# Patient Record
Sex: MALE | Race: WHITE | NOT HISPANIC OR LATINO | Employment: OTHER | URBAN - METROPOLITAN AREA
[De-identification: names, ages, dates, MRNs, and addresses within clinical notes are randomized per-mention and may not be internally consistent; named-entity substitution may affect disease eponyms.]

---

## 2017-01-16 ENCOUNTER — COMMUNICATION - HEALTHEAST (OUTPATIENT)
Dept: INTERNAL MEDICINE | Facility: CLINIC | Age: 37
End: 2017-01-16

## 2017-01-16 ENCOUNTER — OFFICE VISIT - HEALTHEAST (OUTPATIENT)
Dept: INTERNAL MEDICINE | Facility: CLINIC | Age: 37
End: 2017-01-16

## 2017-01-16 DIAGNOSIS — N52.9 ERECTILE DYSFUNCTION: ICD-10-CM

## 2017-01-16 DIAGNOSIS — E29.1 HYPOGONADISM IN MALE: ICD-10-CM

## 2017-01-26 ENCOUNTER — COMMUNICATION - HEALTHEAST (OUTPATIENT)
Dept: INTERNAL MEDICINE | Facility: CLINIC | Age: 37
End: 2017-01-26

## 2017-02-09 ENCOUNTER — COMMUNICATION - HEALTHEAST (OUTPATIENT)
Dept: INTERNAL MEDICINE | Facility: CLINIC | Age: 37
End: 2017-02-09

## 2017-03-10 ENCOUNTER — COMMUNICATION - HEALTHEAST (OUTPATIENT)
Dept: INTERNAL MEDICINE | Facility: CLINIC | Age: 37
End: 2017-03-10

## 2017-03-13 ENCOUNTER — COMMUNICATION - HEALTHEAST (OUTPATIENT)
Dept: INTERNAL MEDICINE | Facility: CLINIC | Age: 37
End: 2017-03-13

## 2017-03-20 ENCOUNTER — COMMUNICATION - HEALTHEAST (OUTPATIENT)
Dept: INTERNAL MEDICINE | Facility: CLINIC | Age: 37
End: 2017-03-20

## 2017-03-22 ENCOUNTER — COMMUNICATION - HEALTHEAST (OUTPATIENT)
Dept: INTERNAL MEDICINE | Facility: CLINIC | Age: 37
End: 2017-03-22

## 2017-08-03 ENCOUNTER — COMMUNICATION - HEALTHEAST (OUTPATIENT)
Dept: INTERNAL MEDICINE | Facility: CLINIC | Age: 37
End: 2017-08-03

## 2017-10-25 ENCOUNTER — COMMUNICATION - HEALTHEAST (OUTPATIENT)
Dept: INTERNAL MEDICINE | Facility: CLINIC | Age: 37
End: 2017-10-25

## 2017-10-26 ENCOUNTER — RECORDS - HEALTHEAST (OUTPATIENT)
Dept: ADMINISTRATIVE | Facility: OTHER | Age: 37
End: 2017-10-26

## 2018-05-04 ENCOUNTER — COMMUNICATION - HEALTHEAST (OUTPATIENT)
Dept: INTERNAL MEDICINE | Facility: CLINIC | Age: 38
End: 2018-05-04

## 2018-05-04 DIAGNOSIS — N52.9 ERECTILE DYSFUNCTION: ICD-10-CM

## 2018-06-04 ENCOUNTER — OFFICE VISIT - HEALTHEAST (OUTPATIENT)
Dept: INTERNAL MEDICINE | Facility: CLINIC | Age: 38
End: 2018-06-04

## 2018-06-04 DIAGNOSIS — N52.9 ERECTILE DYSFUNCTION: ICD-10-CM

## 2018-06-04 DIAGNOSIS — E29.1 HYPOGONADISM IN MALE: ICD-10-CM

## 2018-06-04 ASSESSMENT — MIFFLIN-ST. JEOR: SCORE: 1534.95

## 2018-06-11 ENCOUNTER — COMMUNICATION - HEALTHEAST (OUTPATIENT)
Dept: INTERNAL MEDICINE | Facility: CLINIC | Age: 38
End: 2018-06-11

## 2018-06-11 DIAGNOSIS — N52.9 ERECTILE DYSFUNCTION: ICD-10-CM

## 2018-06-12 ENCOUNTER — COMMUNICATION - HEALTHEAST (OUTPATIENT)
Dept: INTERNAL MEDICINE | Facility: CLINIC | Age: 38
End: 2018-06-12

## 2018-06-12 DIAGNOSIS — N52.9 ERECTILE DYSFUNCTION: ICD-10-CM

## 2019-06-20 ENCOUNTER — COMMUNICATION - HEALTHEAST (OUTPATIENT)
Dept: INTERNAL MEDICINE | Facility: CLINIC | Age: 39
End: 2019-06-20

## 2019-06-20 DIAGNOSIS — N52.9 ERECTILE DYSFUNCTION: ICD-10-CM

## 2019-09-05 ENCOUNTER — COMMUNICATION - HEALTHEAST (OUTPATIENT)
Dept: INTERNAL MEDICINE | Facility: CLINIC | Age: 39
End: 2019-09-05

## 2019-09-06 ENCOUNTER — COMMUNICATION - HEALTHEAST (OUTPATIENT)
Dept: INTERNAL MEDICINE | Facility: CLINIC | Age: 39
End: 2019-09-06

## 2019-09-06 DIAGNOSIS — N52.9 ERECTILE DYSFUNCTION: ICD-10-CM

## 2019-11-07 ENCOUNTER — COMMUNICATION - HEALTHEAST (OUTPATIENT)
Dept: INTERNAL MEDICINE | Facility: CLINIC | Age: 39
End: 2019-11-07

## 2019-11-07 DIAGNOSIS — N52.9 ERECTILE DYSFUNCTION: ICD-10-CM

## 2020-02-03 ENCOUNTER — COMMUNICATION - HEALTHEAST (OUTPATIENT)
Dept: INTERNAL MEDICINE | Facility: CLINIC | Age: 40
End: 2020-02-03

## 2020-02-03 DIAGNOSIS — N52.9 ERECTILE DYSFUNCTION: ICD-10-CM

## 2020-03-11 ENCOUNTER — OFFICE VISIT (OUTPATIENT)
Dept: URGENT CARE | Facility: URGENT CARE | Age: 40
End: 2020-03-11
Payer: COMMERCIAL

## 2020-03-11 ENCOUNTER — VIRTUAL VISIT (OUTPATIENT)
Dept: FAMILY MEDICINE | Facility: OTHER | Age: 40
End: 2020-03-11

## 2020-03-11 ENCOUNTER — COMMUNICATION - HEALTHEAST (OUTPATIENT)
Dept: SCHEDULING | Facility: CLINIC | Age: 40
End: 2020-03-11

## 2020-03-11 DIAGNOSIS — R05.9 COUGH: Primary | ICD-10-CM

## 2020-03-11 DIAGNOSIS — R50.9 FEVER, UNSPECIFIED: ICD-10-CM

## 2020-03-11 LAB
FLUAV+FLUBV AG SPEC QL: NEGATIVE
FLUAV+FLUBV AG SPEC QL: NEGATIVE
SPECIMEN SOURCE: NORMAL

## 2020-03-11 PROCEDURE — 99203 OFFICE O/P NEW LOW 30 MIN: CPT | Performed by: INTERNAL MEDICINE

## 2020-03-11 PROCEDURE — 87804 INFLUENZA ASSAY W/OPTIC: CPT | Performed by: INTERNAL MEDICINE

## 2020-03-11 NOTE — PROGRESS NOTES
"Date: 2020 13:26:09  Clinician: Adelina Romero  Clinician NPI: [id number]  Patient: Gerry Blevins  Patient : 1980  Patient Address: [address] [place] [address]  Patient Phone: [phone]  Visit Protocol: URI  Patient Summary:  Gerry is a 40 year old ( : 1980 ) male who initiated a Visit for COVID-19 (Coronavirus) evaluation and screening. When asked the question \"Please sign me up to receive news, health information and promotions from [employer].\", Gerry responded \"Yes\".    Gerry states his symptoms started suddenly 3-6 days ago. After his symptoms started, they improved and then got worse again.   His symptoms consist of rhinitis, malaise, a sore throat, a cough, nasal congestion, a headache, enlarged lymph nodes, chills, facial pain or pressure, and myalgia. He is experiencing difficulty breathing due to nasal congestion but he is not short of breath. Gerry also feels feverish but was unable to measure his temperature.   Symptom details     Nasal secretions: The color of his mucus is yellow.    Cough: Gerry coughs every 5-10 minutes and his cough is not more bothersome at night. Phlegm comes into his throat when he coughs. He does not believe his cough is caused by post-nasal drip. The color of the phlegm is yellow.     Sore throat: Gerry reports having severe throat pain (7-9 on a 10 point pain scale), does not have exudate on his tonsils, and can swallow liquids. The lymph nodes in his neck are enlarged. A rash has not appeared on the skin since the sore throat started.     Facial pain or pressure: The facial pain or pressure does not feel worse when bending or leaning forward.     Headache: He states the headache is moderate (4-6 on a 10 point pain scale).      Gerry denies having wheezing, ear pain, and teeth pain. He also denies having recent facial or sinus surgery in the past 60 days and having a sinus infection within the past year.   Precipitating events  Gerry is not " sure if he has been exposed to someone with strep throat. He has not recently been exposed to someone with influenza. Gerry has been in close contact with the following high risk individuals: people with asthma, heart disease or diabetes and adults 65 or older.   Pertinent COVID-19 (Coronavirus) information  Gerry has traveled internationally in the last 14 days before the start of his symptoms. Countries traveled as reported by the patient (free text): 4 domestic flights including 2 layovers at [place] [employer].  I count that as relevant.   Gerry has not had close contact with a laboratory confirmed positive COVID-19 patient within 14 days of symptom onset.   Pertinent medical history  Gerry has taken an antibiotic medication in the past month. Antibiotic details as reported by the patient (free text): Penicillin [id number] [id number] to rule out strep.  No improvement seen in fever or pharyngitis.  I'm a doctor.   Gerry does not need a return to work/school note.   Weight: 135 lbs   Gerry does not smoke or use smokeless tobacco.   Additional information as reported by the patient (free text): I work in a clinical setting.  Need to know if it's safe to return to work in [place].   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 So for that reason, I am giving you the information to allow you to go get tested.&nbsp;  Here is the most current information/recommendations from the [place] Department of [facility] in case you want to review for  yourself to decide if you want to go in for the testing:&nbsp;  [email or web address]/diseases/coronavirus/hcp/eval.html       Based on the information you have provided, it is recommended that you go to one of our designated Corona Virus 19 testing centers to get a test done from your car. To do this follow these instructions:   You should go to one of our dedicated testing centers as soon as possible during the hours below at one of these locations:   Walk-in Care: [facility] at [address], [place] [address]. Hours: M-F 7am - 6pm, Sat-Sun 8am -- 3pm  [facility] at [address] [place] [id number]. Hours: Every Day 9am -- 8pm  Walk-in Care: [facility] at [address] [place] [address]. Hours: M-F 7am - 6pm, Sat-Sun 8am -- 3pm  [place] [address] [place] [address]. Hours: M-F 11am -- 8pm, Sat-Sun 9am-4pm   What to expect:   When you arrive please come park in the parking lot.  Call [phone] and let them know which of the four clinics you are at, description of your car and where you are parked. Mention you did an [facility] visit and were sent for testing.  They will add you to the queue to get your test (you will stay in your car the entire time).  On that phone call you will give them the information to register you for the visit.  You will then be met by a provider who will perform a brief assessment in your car and collect samples to send for Corona Virus 19, influenza and possibly RSV.  You will be given patient information about respiratory illnesses and instructions about signing up for mychart to get your results as quickly as possible or we will call you with the results if you are not on mychart.   Isolate Yourself:   Isolate yourself while traveling.  Do Not allow any visitors within 6 feet.  Do Not go to work or school.  Do Not go to [facility],  centers, shopping, or other  public places.  Do Not shake hands.  Avoid close contact with others (hugging, kissing).   Protect Others:   Cover Your Mouth and Nose with a mask, disposable tissue or wash cloth to avoid spreading germs to others.  Wash your hands and face frequently with soap and water   Fever Medicines:   For fever relief, take acetaminophen or ibuprofen.  Treat fevers above 101deg F (38.3deg C) to lower fevers and make you more comfortable.  Acetaminophen (e.g., Tylenol): Take 650 mg (two 325 mg pills) by mouth every 4-6 hours as needed of regular strength Tylenol or 1,000 mg (two 500 mg pills) every 8 hours as needed of Extra Strength Tylenol.  Ibuprofen (e.g., Motrin, Advil): Take 400 mg (two 200 mg pills) by mouth every 6 hours as needed.  Acetaminophen is thought to be safer than ibuprofen or naproxen for people over 65 years old. Acetaminophen is in many OTC and prescription medicines. It might be in more than one medicine that you are taking. You need to be careful and not take an overdose. Before taking any medicine, read all the instructions on the package.  Caution -NSAIDs (e.g., ibuprofen, naproxen): Do not take nonsteroidal anti-inflammatory drugs (NSAIDs) if you have stomach problems, kidney disease, heart failure, or other contraindications to using this type of medicine. Do not take NSAID medicines for over 7 days without consulting your PCP. Do not take NSAID medicines if you are pregnant. Do not take NSAID medicines if you are also taking blood thinners.   Call Back If: Breathing difficulty develops or you become worse.  Thank you for limiting contact with others, wearing a simple mask to cover your cough, practice good hand hygiene habits and accessing our virtual services where possible to limit the spread of this virus.  For more information about COVID19 and options for caring for yourself at home, please visit the CDC website at https://www.cdc.gov/coronavirus/2019-ncov/about/steps-when-sick.html  For more  options for care at [facility], please visit our website at [email or web address]    Diagnosis: Cough  Diagnosis ICD: R05

## 2020-03-11 NOTE — PATIENT INSTRUCTIONS
You are being tested for Corona Virus 19, Influenza and possibly RSV.    Please use the information at the end of this document to sign up for [facility] [email or web address] where you can get your results and a message about those results sent to you through the [employer]TV application. If you do not have mychart we will call you with your results but it may take longer.    Isolate Yourself:    Isolate yourself while traveling.    Do Not allow any visitors within 6 feet.    Do Not go to work or school.    Do Not go to [employer],  centers, shopping, or other public places.    Do Not shake hands.    Avoid close contact with others (hugging, kissing).    Protect Others:    Cover Your Mouth and Nose with a mask, disposable tissue or wash cloth to avoid spreading germs to others.    Wash your hands and face frequently with soap and water    Call Back If: Breathing difficulty develops or you become worse.    For more information about COVID19 and options for caring for yourself at home, please visit the CDC website at https://www.cdc.gov/coronavirus/2019-ncov/about/steps-when-sick.html  For more options for care at [facility], please visit our website at [email or web address]

## 2020-03-11 NOTE — PROGRESS NOTES
SUBJECTIVE:  This  40 year old male presents for COVID-19 testing.  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  Not exposed to a known COVID-19 case but was in a region with a higher number of cases.  He notes onset of high fever, sore throat, cough and nasal congestion on 3/8.    OBJECTIVE:  Visual assessment shows an overtly healthy adult male, non-toxic. No apparent respiratory distress; speaking comfortably.    ASSESSMENT/PLAN:    ICD-10-CM    1. Cough  R05 Influenza A & B Antigen     COVID-19 Virus (Coronavirus), PCR - NP & OP Swabs Nasopharyngeal (NP) Swab in UTM AND Oropharyngeal (OP) Swab in UTM   2. Fever, unspecified  R50.9 Influenza A & B Antigen     COVID-19 Virus (Coronavirus), PCR - NP & OP Swabs Nasopharyngeal (NP) Swab in UTM AND Oropharyngeal (OP) Swab in UTM   Total time spent face-to-face with the patient/family was 10 minutes of which 5 minutes were spent in counseling and care coordination.  Discussions focussed on the above documented medical issues.     Doe Cole MD

## 2020-03-13 ENCOUNTER — TELEPHONE (OUTPATIENT)
Dept: URGENT CARE | Facility: URGENT CARE | Age: 40
End: 2020-03-13

## 2020-03-13 NOTE — RESULT ENCOUNTER NOTE
Lab test 2019-nCoV rRt-PCR  Oropharyngeal AND/OR Nasopharyngeal swabs are NEGATIVE for 2019-nCoV RNA

## 2020-03-21 LAB
COVID-19 VIRUS PCR RESULT FROM MDH: NEGATIVE
LAB SCANNED RESULT: NORMAL

## 2020-03-22 ENCOUNTER — HEALTH MAINTENANCE LETTER (OUTPATIENT)
Age: 40
End: 2020-03-22

## 2020-04-22 ENCOUNTER — COMMUNICATION - HEALTHEAST (OUTPATIENT)
Dept: INTERNAL MEDICINE | Facility: CLINIC | Age: 40
End: 2020-04-22

## 2020-04-22 DIAGNOSIS — N52.9 ERECTILE DYSFUNCTION: ICD-10-CM

## 2020-07-22 ENCOUNTER — COMMUNICATION - HEALTHEAST (OUTPATIENT)
Dept: INTERNAL MEDICINE | Facility: CLINIC | Age: 40
End: 2020-07-22

## 2020-07-22 DIAGNOSIS — N52.9 ERECTILE DYSFUNCTION: ICD-10-CM

## 2021-01-15 ENCOUNTER — HEALTH MAINTENANCE LETTER (OUTPATIENT)
Age: 41
End: 2021-01-15

## 2021-05-15 ENCOUNTER — HEALTH MAINTENANCE LETTER (OUTPATIENT)
Age: 41
End: 2021-05-15

## 2021-05-29 NOTE — TELEPHONE ENCOUNTER
Medication Request  Medication name:   sildenafil, antihypertensive, (REVATIO) 20 mg tablet 90 tablet 12 2018     Sig: TAKE 1 TABLET BY MOUTH AS NEEDED FOR ERECTILE DYSFUNCTION. MAX 5 TABLETS IN 24 HOURS      Pharmacy Name and Location: [facility]  Reason for request: Original Rx is   When did you use medication last?:  Unknown  Patient offered appointment:  Request was made by patient's pharmacy  Okay to leave a detailed message: no

## 2021-05-30 VITALS — BODY MASS INDEX: 22.55 KG/M2 | WEIGHT: 144 LBS

## 2021-06-01 VITALS — BODY MASS INDEX: 23.23 KG/M2 | HEIGHT: 67 IN | WEIGHT: 148 LBS

## 2021-06-01 NOTE — TELEPHONE ENCOUNTER
Updated refill request has been set up for Dr. Pham to review per message below.    Left detailed message for the patient relaying message below from Dr. Pham.  Asked that he call with any further questions.  Rosa NARVAEZ CMA/MCKINLEY....................9:04 AM

## 2021-06-01 NOTE — TELEPHONE ENCOUNTER
Medication Question or Clarification  Who is calling: Pharmacy: [facility]  What medication are you calling about? (include dose and sig)   sildenafil (REVATIO) 20 mg tablet 20 tablet 2 6/20/2019     Sig: TAKE 1 TABLET BY MOUTH AS NEEDED FOR ERECTILE DYSFUNCTION. MAX 5 TABLETS IN 24 HOURS    Sent to pharmacy as: sildenafil (REVATIO) 20 mg tablet        Who prescribed the medication?: Dr Meeks  What is your question/concern?: Pharmacy states patient is requesting a quantity of 90 as he pays out of pocket for this med.  Pharmacy: [facility]  Okay to leave a detailed message?: Yes  Site CMT - Please call the pharmacy to obtain any additional needed information.

## 2021-06-03 NOTE — TELEPHONE ENCOUNTER
Caller stated that patient did not received medication. Caller stated that patient reported not receiving the most recent refill, caller stated that the package might of been stolen from the front porch. Caller reported there are no remaining refills on prescription.       Refill Request  Did you contact pharmacy: [facility]  Medication name:   Requested Prescriptions     Pending Prescriptions Disp Refills     sildenafil (REVATIO) 20 mg tablet 90 tablet 2     Sig: TAKE 1 TABLET BY MOUTH AS NEEDED FOR ERECTILE DYSFUNCTION. MAX 5 TABLETS IN 24 HOURS     Who prescribed the medication: Ricco Pham MD    Pharmacy Name and Location: [facility] pharmacy  Is patient out of medication: n/a  Patient notified refills processed in 72 hours:  N/a    Okay to leave a detailed message: no

## 2021-06-05 NOTE — TELEPHONE ENCOUNTER
RN cannot approve Refill Request    RN can NOT refill this medication Protocol failed and NO refill given.      Adelina Barrera, [facility] Triage/Med Refill 2/3/2020    Requested Prescriptions   Pending Prescriptions Disp Refills     sildenafil (REVATIO) 20 mg tablet 90 tablet 2     Sig: TAKE 1 TABLET BY MOUTH AS NEEDED FOR ERECTILE DYSFUNCTION. MAX 5 TABLETS IN 24 HOURS       Medications for Impotence Refill Protocol Failed - 2/3/2020  9:32 AM        Failed - PCP or prescribing provider visit in last year     Last office visit with prescriber/PCP: 6/4/2018 Ricco Pham MD OR same dept: Visit date not found OR same specialty: 6/4/2018 Ricco Pham MD  Last physical: Visit date not found Last MTM visit: Visit date not found   Next visit within 3 mo: Visit date not found  Next physical within 3 mo: Visit date not found  Prescriber OR PCP: Ricco Pham MD  Last diagnosis associated with med order: 1. Erectile dysfunction  - sildenafil (REVATIO) 20 mg tablet; TAKE 1 TABLET BY MOUTH AS NEEDED FOR ERECTILE DYSFUNCTION. MAX 5 TABLETS IN 24 HOURS  Dispense: 90 tablet; Refill: 2    If protocol passes may refill for 12 months if within 3 months of last provider visit (or a total of 15 months).

## 2021-06-06 NOTE — TELEPHONE ENCOUNTER
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   Gerry has traveled to [place].  Gerry has not traveled internationally but has used international airports.  Currently has a fever of 102, intermittent chils  body aches.  Severe sore throat.  Cough developed today and is very dry.  Gerry does not want to be tested for strep throat.  Gerry has concerns that he has corona virus and also from his occupation.   Symptoms started a couple of days ago.  Gerry is staying hydrated.  Gerry states that he will go to [email or web address].      Reason for Disposition    SEVERE (e.g., excruciating) throat pain    Protocols used: SORE THROAT-A-AH

## 2021-06-08 NOTE — PROGRESS NOTES
[facility] Follow Up Note    Gerry Blevins   36 y.o. male    Date of Visit: 1/16/2017    Chief Complaint   Patient presents with     Follow-up     med check     Subjective  This is a 36-year-old man with known hypogonadism and resultant erectile dysfunction.  He has been using medication with good results in the past one year but apparently his insurance will no longer cover it.  The insurance company is a little vague as to what they will cover but his insurance company is saying that the Cialis might be an option.  He feels fine.  He is active and busy.  He gets good results from the medication.  He continues to travel very regularly on his job.  He offers no other specific complaints at this time.    ROS A comprehensive review of systems was performed and was otherwise negative    Medications, allergies, and problem list were reviewed and updated    Exam  General Appearance:   On examination his blood pressure is 120/64.  Weight is 144 pounds.  Heart is in a sinus rhythm with a rate of 94 and no ectopy.  The patient is alert and oriented ×3.      Assessment/Plan  1. Hypogonadism in male     2. Erectile dysfunction       Hypogonadism and erectile dysfunction.  We spent some time discussing the issue.  I am not sure if he has the best coverage by taking when necessary 10 or 20 mg Cialis or by taking the 5 mg daily dose.  After discussion I told him we would contact her pharmacist to see if she could determine from his insurance what would be more financially beneficial and let him know.  In the meantime, I did give him a prescription for 20 mg Cialis with a coupon book to try until we have an answer.  I will follow-up with him as needed.  Body Mass Index was not assessed due to patient refusal.    Ricco Pham MD      Current Outpatient Prescriptions on File Prior to Visit   Medication Sig     buPROPion (WELLBUTRIN SR) 150 MG 12 hr tablet      sildenafil (REVATIO) 20 mg tablet TAKE 1  TABLET(20 MG) BY MOUTH DAILY     sildenafil (VIAGRA) 100 MG tablet Take 1 tablet (100 mg total) by mouth as needed for erectile dysfunction.     zolpidem (AMBIEN) 10 mg tablet      [DISCONTINUED] sildenafil (REVATIO) 20 mg tablet TAKE 1 TABLET(20 MG) BY MOUTH DAILY     [DISCONTINUED] sildenafil (REVATIO) 20 mg tablet TAKE 1 TABLET(20 MG) BY MOUTH DAILY     No current facility-administered medications on file prior to visit.      No Known Allergies  Social History   Substance Use Topics     Smoking status: Current Every Day Smoker     Packs/day: 1.00     Types: Cigarettes     Last attempt to quit: 3/1/2016     Smokeless tobacco: None     Alcohol use None

## 2021-06-18 NOTE — PROGRESS NOTES
[facility] Follow Up Note    Gerry Blevins   38 y.o. male    Date of Visit: 6/4/2018    Chief Complaint   Patient presents with     Follow-up     Subjective  This is a 38-year-old man with known hypogonadism and resultant erectile dysfunction.  He comes in for routine follow-up.  We had not seen him in a while and it seemed a good idea before doing any medication refills.  He is on sildenafil 20 mg as needed.  He is hoping that on the next refill we can give him 90 tablets.  He has had a prior authorization and established a good program to get these taken care of.  They have been of great help to him in terms of managing his symptoms.  He continues to travel a lot for his job but is hoping to change this.  He now has a steady girlfriend.  He is otherwise feeling good and has no other specific complaints or issues.  He reports no other medical problems since I last saw him.    ROS A comprehensive review of systems was performed and was otherwise negative    Medications, allergies, and problem list were reviewed and updated    Exam  General Appearance:   On examination his blood pressure is 122/60.  Weight is 148 pounds and height is 67 inches.  BMI is 23.18.    Heart is in a sinus rhythm with a rate of 108 and no ectopy.    Lungs are clear.    Neck is supple with no masses and no neck vein distention.  No thyroid enlargement.    No peripheral edema.    The patient is alert and oriented ×3.      Assessment/Plan  1. Hypogonadism in male     2. Erectile dysfunction       Hypogonadism and erectile dysfunction.  Doing well with sildenafil.  I would certainly be okay with his having a prescription for 90 per month and his next refill.    No other new medical problems.    See me back as needed.  Total time of this office visit was 25 minutes with greater than 50% of the time spent in care coordination and patient counseling.      Ricco Pham MD      Current Outpatient Prescriptions on File  Prior to Visit   Medication Sig     buPROPion (WELLBUTRIN SR) 150 MG 12 hr tablet      sildenafil, antihypertensive, (REVATIO) 20 mg tablet TAKE 1 TABLET BY MOUTH AS NEEDED FOR ERECTILE DYSFUNCTION. MAX 5 TABLETS IN 24 HOURS     zolpidem (AMBIEN) 10 mg tablet      No current facility-administered medications on file prior to visit.      No Known Allergies  Social History   Substance Use Topics     Smoking status: Current Some Day Smoker     Packs/day: 1.00     Types: Cigarettes     Last attempt to quit: 3/1/2016     Smokeless tobacco: Never Used     Alcohol use Not on file

## 2021-09-04 ENCOUNTER — HEALTH MAINTENANCE LETTER (OUTPATIENT)
Age: 41
End: 2021-09-04

## 2022-05-29 ENCOUNTER — OFFICE VISIT (OUTPATIENT)
Dept: URGENT CARE | Facility: URGENT CARE | Age: 42
End: 2022-05-29
Payer: COMMERCIAL

## 2022-05-29 VITALS
OXYGEN SATURATION: 97 % | BODY MASS INDEX: 21.93 KG/M2 | TEMPERATURE: 97.9 F | SYSTOLIC BLOOD PRESSURE: 112 MMHG | WEIGHT: 140 LBS | RESPIRATION RATE: 16 BRPM | HEART RATE: 82 BPM | DIASTOLIC BLOOD PRESSURE: 68 MMHG

## 2022-05-29 DIAGNOSIS — L03.213 PERIORBITAL CELLULITIS OF LEFT EYE: Primary | ICD-10-CM

## 2022-05-29 DIAGNOSIS — H00.014 HORDEOLUM EXTERNUM LEFT UPPER EYELID: ICD-10-CM

## 2022-05-29 PROCEDURE — 96372 THER/PROPH/DIAG INJ SC/IM: CPT | Performed by: INTERNAL MEDICINE

## 2022-05-29 PROCEDURE — 99213 OFFICE O/P EST LOW 20 MIN: CPT | Mod: 25 | Performed by: INTERNAL MEDICINE

## 2022-05-29 RX ORDER — CEFTRIAXONE SODIUM 1 G
1 VIAL (EA) INJECTION ONCE
Status: COMPLETED | OUTPATIENT
Start: 2022-05-29 | End: 2022-05-29

## 2022-05-29 RX ADMIN — Medication 1 G: at 15:41

## 2022-05-29 NOTE — PROGRESS NOTES
Assessment & Plan     Periorbital cellulitis of left eye  - cefTRIAXone (ROCEPHIN) injection 1 g    Hordeolum externum left upper eyelid  Hot packing advised.  See ophtho if not improving in one week if he needs actual I & D.    Doe Cole MD  [facility] [place]    Subjective     HPI   Chief complaint of swelling involving the left upper lid for the past three days.  He has had a prior history of sty.  He has been hot-packing somewhat.  Getting more pain in the lid and swelling/redness.  Denies vision changes or eye drainage.  No pain with eye movement.  Denies URI symptoms, ear pain, nasal congestion.     Review of Systems   Constitutional, HEENT, cardiovascular, pulmonary, gi and gu systems are negative, except as otherwise noted.      Objective    /68   Pulse 82   Temp 97.9  F (36.6  C) (Temporal)   Resp 16   Wt 63.5 kg (140 lb)   SpO2 97%   BMI 21.93 kg/m    Body mass index is 21.93 kg/m .  Physical Exam   GENERAL APPEARANCE: healthy, alert and no distress  EYES: swelling, redness and tenderness of the left upper lid with a small nodule medially and a pustule near the lid margin; PERRL and EOMI without pain  HENT: ear canals and TM's normal and nose and mouth without ulcers or lesions  NECK: no adenopathy, no asymmetry, masses, or scars and thyroid normal to palpation

## 2022-06-11 ENCOUNTER — HEALTH MAINTENANCE LETTER (OUTPATIENT)
Age: 42
End: 2022-06-11

## 2022-10-22 ENCOUNTER — HEALTH MAINTENANCE LETTER (OUTPATIENT)
Age: 42
End: 2022-10-22

## 2023-06-18 ENCOUNTER — HEALTH MAINTENANCE LETTER (OUTPATIENT)
Age: 43
End: 2023-06-18

## 2024-08-11 ENCOUNTER — HEALTH MAINTENANCE LETTER (OUTPATIENT)
Age: 44
End: 2024-08-11

## 2025-02-24 NOTE — TELEPHONE ENCOUNTER
DIAGNOSIS: (L) knee pain / self referred / [facility] / no previous surgery or imaging    APPOINTMENT DATE: 3/5/25   NOTES STATUS DETAILS   OFFICE NOTE from referring provider N/A Self Referral    MEDICATION LIST Internal

## 2025-02-27 DIAGNOSIS — M25.562 LEFT KNEE PAIN, UNSPECIFIED CHRONICITY: Primary | ICD-10-CM

## 2025-03-05 ENCOUNTER — PRE VISIT (OUTPATIENT)
Dept: ORTHOPEDICS | Facility: CLINIC | Age: 45
End: 2025-03-05

## 2025-03-17 DIAGNOSIS — M25.562 LEFT KNEE PAIN, UNSPECIFIED CHRONICITY: Primary | ICD-10-CM

## 2025-03-19 ENCOUNTER — ANCILLARY PROCEDURE (OUTPATIENT)
Dept: GENERAL RADIOLOGY | Facility: CLINIC | Age: 45
End: 2025-03-19
Attending: FAMILY MEDICINE
Payer: COMMERCIAL

## 2025-03-19 ENCOUNTER — OFFICE VISIT (OUTPATIENT)
Dept: ORTHOPEDICS | Facility: CLINIC | Age: 45
End: 2025-03-19
Payer: COMMERCIAL

## 2025-03-19 DIAGNOSIS — M25.552 CHRONIC LEFT HIP PAIN: Primary | ICD-10-CM

## 2025-03-19 DIAGNOSIS — M25.562 LEFT KNEE PAIN, UNSPECIFIED CHRONICITY: ICD-10-CM

## 2025-03-19 DIAGNOSIS — G89.29 CHRONIC PAIN OF LEFT KNEE: ICD-10-CM

## 2025-03-19 DIAGNOSIS — M25.562 CHRONIC PAIN OF LEFT KNEE: ICD-10-CM

## 2025-03-19 DIAGNOSIS — G89.29 CHRONIC LEFT HIP PAIN: Primary | ICD-10-CM

## 2025-03-19 DIAGNOSIS — M25.552 CHRONIC LEFT HIP PAIN: ICD-10-CM

## 2025-03-19 DIAGNOSIS — G89.29 CHRONIC LEFT HIP PAIN: ICD-10-CM

## 2025-03-19 PROCEDURE — 73502 X-RAY EXAM HIP UNI 2-3 VIEWS: CPT | Mod: GC | Performed by: RADIOLOGY

## 2025-03-19 PROCEDURE — 73562 X-RAY EXAM OF KNEE 3: CPT | Mod: LT | Performed by: RADIOLOGY

## 2025-03-19 PROCEDURE — 1125F AMNT PAIN NOTED PAIN PRSNT: CPT | Performed by: FAMILY MEDICINE

## 2025-03-19 PROCEDURE — 99203 OFFICE O/P NEW LOW 30 MIN: CPT | Performed by: FAMILY MEDICINE

## 2025-03-19 NOTE — PROGRESS NOTES
"ASSESSMENT/PLAN:    Pt is a 45 year old male here today for left knee pain. Pain is significantly improved at this time and not reproducible on exam, however he did have groin pain with FADIR. Knee XR without evidence of bony injury, Hip XR shows osteoarthritis of the the left hip. Discussed course of osteoarthritis and that this may be contributing to the knee pain as well. Patient in agreement with conservative management at this time  Left hip OA- painful  Left knee pain- worse with full weight onto lateral knee- use knee pads or avoid kneeling  - PT referral  Discussion regarding US guided hip injection for pain reasons    - RTC PRN    HPI:   left knee: left hip pain  Location: lateral aspect    Duration:2 years    Trauma/ Fall? No    Able to walk? Yes   Swelling? No    Bruising? No   Numbness/ Tingling? No    Weakness? No    Instability? No   Snapping/Clicking? No   Imaging? XR on 3/19/25   Treatment? OTC medication and colchicine    Pain with kneeling     - 2 years of waxing and waning lateral left knee pain only with kneeling towards the left  - Non radiating pain  - At worst, it is unbearable, resolves with rest  - No catching, locking, or giving out  - Feels \"like someone hammered a railroad tie into the joint space\"  - Tries ibuprofen 800mg at a time, unsure if it helps  - Tried colchicine x1 month, no improvement  - Notably, has also had chronic, intermittent left hip pain    EXAM:     Left knee: Full active and passive ROM. Negative Lachman's, anterior and posterior draw, Tamiko tests. No tenderness of the bony structures of the knee, patellar or quadriceps tendon, medial and lateral joint lines.   Full ROM of the left hip. Pain localized to groin with FADIR, PRITI, IR. Strength of gluteal muscles intact. No tenderness over the greater trochanter.    Past Medical History:   Diagnosis Date    ADD (attention deficit disorder)     Depression     Erectile dysfunction     Fracture, non-healing     LEFT " SCAPHOID    History of ETOH abuse     IN REMISSION      Past Surgical History:   Procedure Laterality Date    C MASTECTOMY FOR GYNECOMASTIA      BILATERAL    GRAFT BONE FROM ILIAC CREST  12/23/2011    Procedure:GRAFT BONE FROM ILIAC CREST; Surgeon:MISSAEL DEE; [facility]    OPEN REDUCTION INTERNAL FIXATION WRIST  12/23/2011    Procedure:OPEN REDUCTION INTERNAL FIXATION WRIST; Open Reduction Internal Fixation Left Scaphoid with Right Iliac Crest Bone Graft  ; Surgeon:MISSAEL DEE; Location:[facility]    REMOVE HARDWARE WRIST  6/25/2012    Procedure: REMOVE HARDWARE WRIST;  Left Scaphoid Screw Removal, Revison Open Reduction Internal Fixation with Vascular and Nonvascularized Bone Graft  Choice Anesthesia;  Surgeon: Missael Dee MD;  Location: [facility]    SURGICAL HISTORY OF -   2002    breast re-construction    WISDOM TEETH[        Current Outpatient Medications   Medication Sig Dispense Refill    azithromycin (ZITHROMAX) 250 MG tablet Take 1g x 1 dose. 4 tablet 0    buPROPion (WELLBUTRIN SR) 150 MG 12 hr tablet Take 300 mg by mouth daily.      methylprednisoLONE (MEDROL DOSEPAK) 4 MG tablet Follow package instructions 21 tablet 0    Sildenafil Citrate, [id number], (VIAGRA PO) Take 1 dose by mouth as needed.        No Known Allergies   ROS:   Gen- no fevers/chills   Rheum - no morning stiffness   Derm - no rash/ redness   Neuro - no numbness, no tingling   Remainder of ROS negative.     Exam:   There were no vitals taken for this visit.     Xray of L knee on March 19, 2025 at [facility] [place] location - films personally reviewed with patient at time of visit     1. No acute osseous abnormality.  2. No substantial degenerative change.    Xray of L hip on March 19, 2025 at [facility] [place] location - films personally reviewed with patient at time of visit     My impression: osteoarthritis of the left hip     Kevan Byrnes  [employer], MS4       Patient seen, evaluated and discussed with the student. I  have verified the content of the note, which accurately reflects my assessment of the patient and the plan of care.   Supervising Physician:  Adina Aguilera MD     03/19/2025 12:42 PM

## 2025-03-19 NOTE — LETTER
"  3/19/2025      RE: Gerry Blevins  [address]   [place] [address]     Dear Colleague,    Thank you for referring your patient, Gerry Blevins, to the [facility] [place]. Please see a copy of my visit note below.    ASSESSMENT/PLAN:    Pt is a 45 year old male here today for left knee pain. Pain is significantly improved at this time and not reproducible on exam, however he did have groin pain with FADIR. Knee XR without evidence of bony injury, Hip XR shows osteoarthritis of the the left hip. Discussed course of osteoarthritis and that this may be contributing to the knee pain as well. Patient in agreement with conservative management at this time  Left hip OA- painful  Left knee pain- worse with full weight onto lateral knee- use knee pads or avoid kneeling  - PT referral  Discussion regarding US guided hip injection for pain reasons    - RTC PRN    HPI:   left knee: left hip pain  Location: lateral aspect    Duration:2 years    Trauma/ Fall? No    Able to walk? Yes   Swelling? No    Bruising? No   Numbness/ Tingling? No    Weakness? No    Instability? No   Snapping/Clicking? No   Imaging? XR on 3/19/25   Treatment? OTC medication and colchicine    Pain with kneeling     - 2 years of waxing and waning lateral left knee pain only with kneeling towards the left  - Non radiating pain  - At worst, it is unbearable, resolves with rest  - No catching, locking, or giving out  - Feels \"like someone hammered a railroad tie into the joint space\"  - Tries ibuprofen 800mg at a time, unsure if it helps  - Tried colchicine x1 month, no improvement  - Notably, has also had chronic, intermittent left hip pain    EXAM:     Left knee: Full active and passive ROM. Negative Lachman's, anterior and posterior draw, Tamiko tests. No tenderness of the bony structures of the knee, patellar or quadriceps tendon, medial and lateral joint lines.   Full ROM of the left hip. Pain localized to groin " with PRITI PAREDES IR. Strength of gluteal muscles intact. No tenderness over the greater trochanter.    Past Medical History:   Diagnosis Date     ADD (attention deficit disorder)      Depression      Erectile dysfunction      Fracture, non-healing     LEFT SCAPHOID     History of ETOH abuse     IN REMISSION      Past Surgical History:   Procedure Laterality Date     C MASTECTOMY FOR GYNECOMASTIA      BILATERAL     GRAFT BONE FROM ILIAC CREST  12/23/2011    Procedure:GRAFT BONE FROM ILIAC CREST; Surgeon:MISSAEL DEE; [facility]     OPEN REDUCTION INTERNAL FIXATION WRIST  12/23/2011    Procedure:OPEN REDUCTION INTERNAL FIXATION WRIST; Open Reduction Internal Fixation Left Scaphoid with Right Iliac Crest Bone Graft  ; Surgeon:MISSAEL DEE; [facility]     REMOVE HARDWARE WRIST  6/25/2012    Procedure: REMOVE HARDWARE WRIST;  Left Scaphoid Screw Removal, Revison Open Reduction Internal Fixation with Vascular and Nonvascularized Bone Graft  Choice Anesthesia;  Surgeon: Missael Dee MD;  Location:  OR     SURGICAL HISTORY OF -   2002    breast re-construction     WISDOM TEETH[        Current Outpatient Medications   Medication Sig Dispense Refill     azithromycin (ZITHROMAX) 250 MG tablet Take 1g x 1 dose. 4 tablet 0     buPROPion (WELLBUTRIN SR) 150 MG 12 hr tablet Take 300 mg by mouth daily.       methylprednisoLONE (MEDROL DOSEPAK) 4 MG tablet Follow package instructions 21 tablet 0     Sildenafil Citrate, [id number], (VIAGRA PO) Take 1 dose by mouth as needed.        No Known Allergies   ROS:   Gen- no fevers/chills   Rheum - no morning stiffness   Derm - no rash/ redness   Neuro - no numbness, no tingling   Remainder of ROS negative.     Exam:   There were no vitals taken for this visit.     [place] [facility] [place] personally reviewed with patient at time of visit     1. No acute osseous abnormality.  2. No substantial degenerative change.    Xray of L hip on March  19, 2025 at [facility] [place] - films personally reviewed with patient at time of visit     My impression: osteoarthritis of the left hip     Kevan Byrnes  [employer], MS4       Patient seen, evaluated and discussed with the student. I have verified the content of the note, which accurately reflects my assessment of the patient and the plan of care.   Supervising Physician:  Adina Aguilera MD     03/19/2025 12:42 PM        Again, thank you for allowing me to participate in the care of your patient.      Sincerely,    Adina Aguilera MD

## 2025-08-04 ENCOUNTER — HOSPITAL ENCOUNTER (OUTPATIENT)
Facility: CLINIC | Age: 45
Setting detail: OBSERVATION
Discharge: HOME | End: 2025-08-05
Attending: EMERGENCY MEDICINE | Admitting: STUDENT IN AN ORGANIZED HEALTH CARE EDUCATION/TRAINING PROGRAM
Payer: COMMERCIAL

## 2025-08-04 DIAGNOSIS — K35.200 ACUTE APPENDICITIS WITH GENERALIZED PERITONITIS WITHOUT GANGRENE, PERFORATION, OR ABSCESS: Primary | ICD-10-CM

## 2025-08-04 DIAGNOSIS — G89.18 POSTOPERATIVE PAIN: ICD-10-CM

## 2025-08-04 LAB
ALBUMIN SERPL BCG-MCNC: 4.1 G/DL (ref 3.5–5.2)
ALP SERPL-CCNC: 105 U/L (ref 40–150)
ALT SERPL W P-5'-P-CCNC: 85 U/L (ref 0–70)
ANION GAP SERPL CALCULATED.3IONS-SCNC: 14 MMOL/L (ref 7–15)
AST SERPL W P-5'-P-CCNC: 76 U/L (ref 0–45)
BILIRUB SERPL-MCNC: 0.7 MG/DL
BUN SERPL-MCNC: 13.6 MG/DL (ref 6–20)
CALCIUM SERPL-MCNC: 9.2 MG/DL (ref 8.8–10.4)
CHLORIDE SERPL-SCNC: 96 MMOL/L (ref 98–107)
CREAT SERPL-MCNC: 0.92 MG/DL (ref 0.67–1.17)
EGFRCR SERPLBLD CKD-EPI 2021: >90 ML/MIN/1.73M2
ERYTHROCYTE [DISTWIDTH] IN BLOOD BY AUTOMATED COUNT: 12.6 % (ref 10–15)
ETHANOL SERPL-MCNC: <0.01 G/DL
GLUCOSE SERPL-MCNC: 130 MG/DL (ref 70–99)
HCO3 SERPL-SCNC: 24 MMOL/L (ref 22–29)
HCT VFR BLD AUTO: 38.7 % (ref 40–53)
HGB BLD-MCNC: 13.3 G/DL (ref 13.3–17.7)
HOLD SPECIMEN: NORMAL
LIPASE SERPL-CCNC: 20 U/L (ref 13–60)
MCH RBC QN AUTO: 30.6 PG (ref 26.5–33)
MCHC RBC AUTO-ENTMCNC: 34.4 G/DL (ref 31.5–36.5)
MCV RBC AUTO: 89 FL (ref 78–100)
PLATELET # BLD AUTO: 226 10E3/UL (ref 150–450)
POTASSIUM SERPL-SCNC: 3.6 MMOL/L (ref 3.4–5.3)
PROT SERPL-MCNC: 7 G/DL (ref 6.4–8.3)
RBC # BLD AUTO: 4.35 10E6/UL (ref 4.4–5.9)
SODIUM SERPL-SCNC: 134 MMOL/L (ref 135–145)
WBC # BLD AUTO: 16.3 10E3/UL (ref 4–11)

## 2025-08-04 PROCEDURE — 258N000003 HC RX IP 258 OP 636: Performed by: EMERGENCY MEDICINE

## 2025-08-04 PROCEDURE — 36415 COLL VENOUS BLD VENIPUNCTURE: CPT | Performed by: EMERGENCY MEDICINE

## 2025-08-04 PROCEDURE — 82310 ASSAY OF CALCIUM: CPT | Performed by: EMERGENCY MEDICINE

## 2025-08-04 PROCEDURE — 83690 ASSAY OF LIPASE: CPT | Performed by: EMERGENCY MEDICINE

## 2025-08-04 PROCEDURE — 99285 EMERGENCY DEPT VISIT HI MDM: CPT | Mod: 25 | Performed by: EMERGENCY MEDICINE

## 2025-08-04 PROCEDURE — 96375 TX/PRO/DX INJ NEW DRUG ADDON: CPT

## 2025-08-04 PROCEDURE — 250N000011 HC RX IP 250 OP 636: Performed by: EMERGENCY MEDICINE

## 2025-08-04 PROCEDURE — 85027 COMPLETE CBC AUTOMATED: CPT | Performed by: EMERGENCY MEDICINE

## 2025-08-04 PROCEDURE — 250N000013 HC RX MED GY IP 250 OP 250 PS 637: Performed by: EMERGENCY MEDICINE

## 2025-08-04 PROCEDURE — 96374 THER/PROPH/DIAG INJ IV PUSH: CPT

## 2025-08-04 PROCEDURE — 82077 ASSAY SPEC XCP UR&BREATH IA: CPT | Performed by: EMERGENCY MEDICINE

## 2025-08-04 PROCEDURE — 80053 COMPREHEN METABOLIC PANEL: CPT | Performed by: EMERGENCY MEDICINE

## 2025-08-04 RX ORDER — MORPHINE SULFATE 4 MG/ML
4 INJECTION, SOLUTION INTRAMUSCULAR; INTRAVENOUS ONCE
Refills: 0 | Status: COMPLETED | OUTPATIENT
Start: 2025-08-04 | End: 2025-08-04

## 2025-08-04 RX ORDER — ONDANSETRON 2 MG/ML
4 INJECTION INTRAMUSCULAR; INTRAVENOUS ONCE
Status: COMPLETED | OUTPATIENT
Start: 2025-08-04 | End: 2025-08-04

## 2025-08-04 RX ORDER — SUCRALFATE ORAL 1 G/10ML
1 SUSPENSION ORAL ONCE
Status: COMPLETED | OUTPATIENT
Start: 2025-08-04 | End: 2025-08-05

## 2025-08-04 RX ORDER — MAGNESIUM HYDROXIDE/ALUMINUM HYDROXICE/SIMETHICONE 120; 1200; 1200 MG/30ML; MG/30ML; MG/30ML
15 SUSPENSION ORAL ONCE
Status: COMPLETED | OUTPATIENT
Start: 2025-08-04 | End: 2025-08-04

## 2025-08-04 RX ADMIN — SODIUM CHLORIDE, SODIUM LACTATE, POTASSIUM CHLORIDE, AND CALCIUM CHLORIDE 1000 ML: .6; .31; .03; .02 INJECTION, SOLUTION INTRAVENOUS at 23:34

## 2025-08-04 RX ADMIN — MORPHINE SULFATE 4 MG: 4 INJECTION, SOLUTION INTRAMUSCULAR; INTRAVENOUS at 23:35

## 2025-08-04 RX ADMIN — ONDANSETRON 4 MG: 2 INJECTION, SOLUTION INTRAMUSCULAR; INTRAVENOUS at 23:35

## 2025-08-04 RX ADMIN — ALUMINUM HYDROXIDE, MAGNESIUM HYDROXIDE, AND SIMETHICONE 15 ML: 200; 200; 20 SUSPENSION ORAL at 23:35

## 2025-08-04 ASSESSMENT — ACTIVITIES OF DAILY LIVING (ADL)
ADLS_ACUITY_SCORE: 41

## 2025-08-04 ASSESSMENT — COLUMBIA-SUICIDE SEVERITY RATING SCALE - C-SSRS
1. IN THE PAST MONTH, HAVE YOU WISHED YOU WERE DEAD OR WISHED YOU COULD GO TO SLEEP AND NOT WAKE UP?: NO
6. HAVE YOU EVER DONE ANYTHING, STARTED TO DO ANYTHING, OR PREPARED TO DO ANYTHING TO END YOUR LIFE?: NO
2. HAVE YOU ACTUALLY HAD ANY THOUGHTS OF KILLING YOURSELF IN THE PAST MONTH?: NO

## 2025-08-05 ENCOUNTER — ANESTHESIA EVENT (OUTPATIENT)
Dept: SURGERY | Facility: CLINIC | Age: 45
End: 2025-08-05
Payer: COMMERCIAL

## 2025-08-05 ENCOUNTER — ANESTHESIA (OUTPATIENT)
Dept: SURGERY | Facility: CLINIC | Age: 45
End: 2025-08-05
Payer: COMMERCIAL

## 2025-08-05 ENCOUNTER — APPOINTMENT (OUTPATIENT)
Dept: CT IMAGING | Facility: CLINIC | Age: 45
End: 2025-08-05
Attending: EMERGENCY MEDICINE
Payer: COMMERCIAL

## 2025-08-05 VITALS
DIASTOLIC BLOOD PRESSURE: 72 MMHG | BODY MASS INDEX: 22.56 KG/M2 | RESPIRATION RATE: 18 BRPM | HEIGHT: 66 IN | TEMPERATURE: 98.5 F | WEIGHT: 140.4 LBS | HEART RATE: 85 BPM | SYSTOLIC BLOOD PRESSURE: 104 MMHG | OXYGEN SATURATION: 98 %

## 2025-08-05 PROBLEM — K35.80 ACUTE APPENDICITIS: Status: ACTIVE | Noted: 2025-08-05

## 2025-08-05 LAB
BASE EXCESS BLDV CALC-SCNC: 2 MMOL/L (ref -3–3)
HCO3 BLDV-SCNC: 27 MMOL/L (ref 21–28)
LACTATE BLD-SCNC: 0.9 MMOL/L (ref 0.7–2)
PCO2 BLDV: 39 MM HG (ref 40–50)
PH BLDV: 7.44 [PH] (ref 7.32–7.43)
PO2 BLDV: 30 MM HG (ref 25–47)
SAO2 % BLDV: 60 % (ref 70–75)

## 2025-08-05 PROCEDURE — 96361 HYDRATE IV INFUSION ADD-ON: CPT | Mod: 59

## 2025-08-05 PROCEDURE — 710N000009 HC RECOVERY PHASE 1, LEVEL 1, PER MIN: Performed by: STUDENT IN AN ORGANIZED HEALTH CARE EDUCATION/TRAINING PROGRAM

## 2025-08-05 PROCEDURE — 258N000003 HC RX IP 258 OP 636: Performed by: SURGERY

## 2025-08-05 PROCEDURE — 370N000017 HC ANESTHESIA TECHNICAL FEE, PER MIN: Performed by: STUDENT IN AN ORGANIZED HEALTH CARE EDUCATION/TRAINING PROGRAM

## 2025-08-05 PROCEDURE — 44970 LAPAROSCOPY APPENDECTOMY: CPT | Performed by: STUDENT IN AN ORGANIZED HEALTH CARE EDUCATION/TRAINING PROGRAM

## 2025-08-05 PROCEDURE — 360N000076 HC SURGERY LEVEL 3, PER MIN: Performed by: STUDENT IN AN ORGANIZED HEALTH CARE EDUCATION/TRAINING PROGRAM

## 2025-08-05 PROCEDURE — 36415 COLL VENOUS BLD VENIPUNCTURE: CPT | Performed by: EMERGENCY MEDICINE

## 2025-08-05 PROCEDURE — 999N000141 HC STATISTIC PRE-PROCEDURE NURSING ASSESSMENT: Performed by: STUDENT IN AN ORGANIZED HEALTH CARE EDUCATION/TRAINING PROGRAM

## 2025-08-05 PROCEDURE — 88304 TISSUE EXAM BY PATHOLOGIST: CPT | Mod: TC | Performed by: STUDENT IN AN ORGANIZED HEALTH CARE EDUCATION/TRAINING PROGRAM

## 2025-08-05 PROCEDURE — G0378 HOSPITAL OBSERVATION PER HR: HCPCS

## 2025-08-05 PROCEDURE — 250N000009 HC RX 250: Performed by: EMERGENCY MEDICINE

## 2025-08-05 PROCEDURE — 87040 BLOOD CULTURE FOR BACTERIA: CPT | Performed by: EMERGENCY MEDICINE

## 2025-08-05 PROCEDURE — 258N000003 HC RX IP 258 OP 636: Performed by: NURSE ANESTHETIST, CERTIFIED REGISTERED

## 2025-08-05 PROCEDURE — 250N000013 HC RX MED GY IP 250 OP 250 PS 637: Performed by: PHYSICIAN ASSISTANT

## 2025-08-05 PROCEDURE — 250N000025 HC SEVOFLURANE, PER MIN: Performed by: STUDENT IN AN ORGANIZED HEALTH CARE EDUCATION/TRAINING PROGRAM

## 2025-08-05 PROCEDURE — 250N000011 HC RX IP 250 OP 636: Performed by: SURGERY

## 2025-08-05 PROCEDURE — 96376 TX/PRO/DX INJ SAME DRUG ADON: CPT | Mod: 59

## 2025-08-05 PROCEDURE — 250N000013 HC RX MED GY IP 250 OP 250 PS 637: Performed by: EMERGENCY MEDICINE

## 2025-08-05 PROCEDURE — 272N000001 HC OR GENERAL SUPPLY STERILE: Performed by: STUDENT IN AN ORGANIZED HEALTH CARE EDUCATION/TRAINING PROGRAM

## 2025-08-05 PROCEDURE — 250N000009 HC RX 250: Performed by: NURSE ANESTHETIST, CERTIFIED REGISTERED

## 2025-08-05 PROCEDURE — 250N000009 HC RX 250: Performed by: STUDENT IN AN ORGANIZED HEALTH CARE EDUCATION/TRAINING PROGRAM

## 2025-08-05 PROCEDURE — 82803 BLOOD GASES ANY COMBINATION: CPT

## 2025-08-05 PROCEDURE — 710N000012 HC RECOVERY PHASE 2, PER MINUTE: Performed by: STUDENT IN AN ORGANIZED HEALTH CARE EDUCATION/TRAINING PROGRAM

## 2025-08-05 PROCEDURE — 96375 TX/PRO/DX INJ NEW DRUG ADDON: CPT

## 2025-08-05 PROCEDURE — 250N000013 HC RX MED GY IP 250 OP 250 PS 637: Performed by: ANESTHESIOLOGY

## 2025-08-05 PROCEDURE — 250N000011 HC RX IP 250 OP 636: Performed by: NURSE ANESTHETIST, CERTIFIED REGISTERED

## 2025-08-05 PROCEDURE — 250N000011 HC RX IP 250 OP 636: Performed by: EMERGENCY MEDICINE

## 2025-08-05 PROCEDURE — 74177 CT ABD & PELVIS W/CONTRAST: CPT

## 2025-08-05 RX ORDER — BUPIVACAINE HCL/EPINEPHRINE 0.5-1:200K
VIAL (ML) INJECTION PRN
Status: DISCONTINUED | OUTPATIENT
Start: 2025-08-05 | End: 2025-08-05 | Stop reason: HOSPADM

## 2025-08-05 RX ORDER — FENTANYL CITRATE 0.05 MG/ML
50 INJECTION, SOLUTION INTRAMUSCULAR; INTRAVENOUS EVERY 5 MIN PRN
Refills: 0 | Status: DISCONTINUED | OUTPATIENT
Start: 2025-08-05 | End: 2025-08-05 | Stop reason: HOSPADM

## 2025-08-05 RX ORDER — LIDOCAINE HYDROCHLORIDE 20 MG/ML
INJECTION, SOLUTION INFILTRATION; PERINEURAL PRN
Status: DISCONTINUED | OUTPATIENT
Start: 2025-08-05 | End: 2025-08-05

## 2025-08-05 RX ORDER — HYDROMORPHONE HCL IN WATER/PF 6 MG/30 ML
0.4 PATIENT CONTROLLED ANALGESIA SYRINGE INTRAVENOUS EVERY 5 MIN PRN
Refills: 0 | Status: DISCONTINUED | OUTPATIENT
Start: 2025-08-05 | End: 2025-08-05 | Stop reason: HOSPADM

## 2025-08-05 RX ORDER — FENTANYL CITRATE 50 UG/ML
INJECTION, SOLUTION INTRAMUSCULAR; INTRAVENOUS PRN
Status: DISCONTINUED | OUTPATIENT
Start: 2025-08-05 | End: 2025-08-05

## 2025-08-05 RX ORDER — LABETALOL HYDROCHLORIDE 5 MG/ML
10 INJECTION, SOLUTION INTRAVENOUS
Status: DISCONTINUED | OUTPATIENT
Start: 2025-08-05 | End: 2025-08-05 | Stop reason: HOSPADM

## 2025-08-05 RX ORDER — HYDROMORPHONE HCL IN WATER/PF 6 MG/30 ML
0.2 PATIENT CONTROLLED ANALGESIA SYRINGE INTRAVENOUS
Refills: 0 | Status: DISCONTINUED | OUTPATIENT
Start: 2025-08-05 | End: 2025-08-05 | Stop reason: HOSPADM

## 2025-08-05 RX ORDER — HYDROMORPHONE HCL IN WATER/PF 6 MG/30 ML
0.2 PATIENT CONTROLLED ANALGESIA SYRINGE INTRAVENOUS EVERY 5 MIN PRN
Refills: 0 | Status: DISCONTINUED | OUTPATIENT
Start: 2025-08-05 | End: 2025-08-05 | Stop reason: HOSPADM

## 2025-08-05 RX ORDER — PIPERACILLIN SODIUM, TAZOBACTAM SODIUM 3; .375 G/15ML; G/15ML
INJECTION, POWDER, LYOPHILIZED, FOR SOLUTION INTRAVENOUS PRN
Status: DISCONTINUED | OUTPATIENT
Start: 2025-08-05 | End: 2025-08-05

## 2025-08-05 RX ORDER — ACETAMINOPHEN 325 MG/1
650 TABLET ORAL
Status: DISCONTINUED | OUTPATIENT
Start: 2025-08-05 | End: 2025-08-05 | Stop reason: HOSPADM

## 2025-08-05 RX ORDER — SODIUM CHLORIDE, SODIUM LACTATE, POTASSIUM CHLORIDE, CALCIUM CHLORIDE 600; 310; 30; 20 MG/100ML; MG/100ML; MG/100ML; MG/100ML
INJECTION, SOLUTION INTRAVENOUS CONTINUOUS
Status: DISCONTINUED | OUTPATIENT
Start: 2025-08-05 | End: 2025-08-05 | Stop reason: HOSPADM

## 2025-08-05 RX ORDER — METHOCARBAMOL 500 MG/1
500 TABLET, FILM COATED ORAL 4 TIMES DAILY PRN
Qty: 30 TABLET | Refills: 0 | Status: SHIPPED | OUTPATIENT
Start: 2025-08-05 | End: 2025-08-15

## 2025-08-05 RX ORDER — PIPERACILLIN SODIUM, TAZOBACTAM SODIUM 3; .375 G/15ML; G/15ML
3.38 INJECTION, POWDER, LYOPHILIZED, FOR SOLUTION INTRAVENOUS EVERY 6 HOURS
Status: DISCONTINUED | OUTPATIENT
Start: 2025-08-05 | End: 2025-08-05 | Stop reason: HOSPADM

## 2025-08-05 RX ORDER — CEFAZOLIN SODIUM/WATER 2 G/20 ML
SYRINGE (ML) INTRAVENOUS PRN
Status: DISCONTINUED | OUTPATIENT
Start: 2025-08-05 | End: 2025-08-05

## 2025-08-05 RX ORDER — NALOXONE HYDROCHLORIDE 0.4 MG/ML
0.1 INJECTION, SOLUTION INTRAMUSCULAR; INTRAVENOUS; SUBCUTANEOUS
Status: DISCONTINUED | OUTPATIENT
Start: 2025-08-05 | End: 2025-08-05 | Stop reason: HOSPADM

## 2025-08-05 RX ORDER — ONDANSETRON 4 MG/1
4 TABLET, ORALLY DISINTEGRATING ORAL EVERY 6 HOURS PRN
Status: DISCONTINUED | OUTPATIENT
Start: 2025-08-05 | End: 2025-08-05 | Stop reason: HOSPADM

## 2025-08-05 RX ORDER — HYDROMORPHONE HCL IN WATER/PF 6 MG/30 ML
0.4 PATIENT CONTROLLED ANALGESIA SYRINGE INTRAVENOUS
Refills: 0 | Status: DISCONTINUED | OUTPATIENT
Start: 2025-08-05 | End: 2025-08-05 | Stop reason: HOSPADM

## 2025-08-05 RX ORDER — ONDANSETRON 2 MG/ML
4 INJECTION INTRAMUSCULAR; INTRAVENOUS EVERY 6 HOURS PRN
Status: DISCONTINUED | OUTPATIENT
Start: 2025-08-05 | End: 2025-08-05 | Stop reason: HOSPADM

## 2025-08-05 RX ORDER — ONDANSETRON 4 MG/1
4 TABLET, ORALLY DISINTEGRATING ORAL EVERY 30 MIN PRN
Status: DISCONTINUED | OUTPATIENT
Start: 2025-08-05 | End: 2025-08-05 | Stop reason: HOSPADM

## 2025-08-05 RX ORDER — DEXAMETHASONE SODIUM PHOSPHATE 4 MG/ML
INJECTION, SOLUTION INTRA-ARTICULAR; INTRALESIONAL; INTRAMUSCULAR; INTRAVENOUS; SOFT TISSUE PRN
Status: DISCONTINUED | OUTPATIENT
Start: 2025-08-05 | End: 2025-08-05

## 2025-08-05 RX ORDER — PROPOFOL 10 MG/ML
INJECTION, EMULSION INTRAVENOUS PRN
Status: DISCONTINUED | OUTPATIENT
Start: 2025-08-05 | End: 2025-08-05

## 2025-08-05 RX ORDER — AMOXICILLIN 250 MG
1 CAPSULE ORAL 2 TIMES DAILY PRN
Status: DISCONTINUED | OUTPATIENT
Start: 2025-08-05 | End: 2025-08-05 | Stop reason: HOSPADM

## 2025-08-05 RX ORDER — AMOXICILLIN 250 MG
1-2 CAPSULE ORAL 2 TIMES DAILY PRN
Qty: 20 TABLET | Refills: 0 | Status: SHIPPED | OUTPATIENT
Start: 2025-08-05

## 2025-08-05 RX ORDER — FENTANYL CITRATE 0.05 MG/ML
25 INJECTION, SOLUTION INTRAMUSCULAR; INTRAVENOUS EVERY 5 MIN PRN
Refills: 0 | Status: DISCONTINUED | OUTPATIENT
Start: 2025-08-05 | End: 2025-08-05 | Stop reason: HOSPADM

## 2025-08-05 RX ORDER — ONDANSETRON 2 MG/ML
4 INJECTION INTRAMUSCULAR; INTRAVENOUS EVERY 30 MIN PRN
Status: DISCONTINUED | OUTPATIENT
Start: 2025-08-05 | End: 2025-08-05 | Stop reason: HOSPADM

## 2025-08-05 RX ORDER — IOPAMIDOL 755 MG/ML
71 INJECTION, SOLUTION INTRAVASCULAR ONCE
Status: COMPLETED | OUTPATIENT
Start: 2025-08-05 | End: 2025-08-05

## 2025-08-05 RX ORDER — LIDOCAINE 40 MG/G
CREAM TOPICAL
Status: DISCONTINUED | OUTPATIENT
Start: 2025-08-05 | End: 2025-08-05 | Stop reason: HOSPADM

## 2025-08-05 RX ORDER — DEXAMETHASONE SODIUM PHOSPHATE 4 MG/ML
4 INJECTION, SOLUTION INTRA-ARTICULAR; INTRALESIONAL; INTRAMUSCULAR; INTRAVENOUS; SOFT TISSUE
Status: DISCONTINUED | OUTPATIENT
Start: 2025-08-05 | End: 2025-08-05 | Stop reason: HOSPADM

## 2025-08-05 RX ORDER — OXYCODONE HYDROCHLORIDE 5 MG/1
2.5-5 TABLET ORAL EVERY 4 HOURS PRN
Qty: 10 TABLET | Refills: 0 | Status: SHIPPED | OUTPATIENT
Start: 2025-08-05

## 2025-08-05 RX ORDER — OXYCODONE HYDROCHLORIDE 5 MG/1
5 TABLET ORAL
Status: COMPLETED | OUTPATIENT
Start: 2025-08-05 | End: 2025-08-05

## 2025-08-05 RX ORDER — AMOXICILLIN 250 MG
2 CAPSULE ORAL 2 TIMES DAILY PRN
Status: DISCONTINUED | OUTPATIENT
Start: 2025-08-05 | End: 2025-08-05 | Stop reason: HOSPADM

## 2025-08-05 RX ORDER — KETOROLAC TROMETHAMINE 30 MG/ML
INJECTION, SOLUTION INTRAMUSCULAR; INTRAVENOUS PRN
Status: DISCONTINUED | OUTPATIENT
Start: 2025-08-05 | End: 2025-08-05

## 2025-08-05 RX ORDER — ACETAMINOPHEN 500 MG
1000 TABLET ORAL ONCE
Status: COMPLETED | OUTPATIENT
Start: 2025-08-05 | End: 2025-08-05

## 2025-08-05 RX ORDER — PIPERACILLIN SODIUM, TAZOBACTAM SODIUM 4; .5 G/20ML; G/20ML
4.5 INJECTION, POWDER, LYOPHILIZED, FOR SOLUTION INTRAVENOUS ONCE
Status: DISCONTINUED | OUTPATIENT
Start: 2025-08-05 | End: 2025-08-05

## 2025-08-05 RX ORDER — PROCHLORPERAZINE MALEATE 10 MG
10 TABLET ORAL EVERY 6 HOURS PRN
Status: DISCONTINUED | OUTPATIENT
Start: 2025-08-05 | End: 2025-08-05 | Stop reason: HOSPADM

## 2025-08-05 RX ADMIN — MIDAZOLAM 2 MG: 1 INJECTION INTRAMUSCULAR; INTRAVENOUS at 07:30

## 2025-08-05 RX ADMIN — ACETAMINOPHEN 1000 MG: 500 TABLET ORAL at 11:37

## 2025-08-05 RX ADMIN — IOPAMIDOL 71 ML: 755 INJECTION, SOLUTION INTRAVENOUS at 00:22

## 2025-08-05 RX ADMIN — PIPERACILLIN AND TAZOBACTAM 3.38 G: 3; .375 INJECTION, POWDER, FOR SOLUTION INTRAVENOUS at 01:47

## 2025-08-05 RX ADMIN — KETOROLAC TROMETHAMINE 15 MG: 30 INJECTION, SOLUTION INTRAMUSCULAR at 08:27

## 2025-08-05 RX ADMIN — SODIUM CHLORIDE 60 ML: 9 INJECTION, SOLUTION INTRAVENOUS at 00:22

## 2025-08-05 RX ADMIN — DEXMEDETOMIDINE HYDROCHLORIDE 12 MCG: 100 INJECTION, SOLUTION INTRAVENOUS at 08:36

## 2025-08-05 RX ADMIN — OXYCODONE HYDROCHLORIDE 5 MG: 5 TABLET ORAL at 09:23

## 2025-08-05 RX ADMIN — ONDANSETRON 4 MG: 2 INJECTION, SOLUTION INTRAMUSCULAR; INTRAVENOUS at 06:21

## 2025-08-05 RX ADMIN — DEXMEDETOMIDINE HYDROCHLORIDE 20 MCG: 100 INJECTION, SOLUTION INTRAVENOUS at 08:39

## 2025-08-05 RX ADMIN — LIDOCAINE HYDROCHLORIDE 80 MG: 20 INJECTION, SOLUTION INFILTRATION; PERINEURAL at 07:37

## 2025-08-05 RX ADMIN — FENTANYL CITRATE 50 MCG: 50 INJECTION INTRAMUSCULAR; INTRAVENOUS at 08:35

## 2025-08-05 RX ADMIN — Medication 200 MG: at 08:31

## 2025-08-05 RX ADMIN — DEXAMETHASONE SODIUM PHOSPHATE 4 MG: 4 INJECTION, SOLUTION INTRA-ARTICULAR; INTRALESIONAL; INTRAMUSCULAR; INTRAVENOUS; SOFT TISSUE at 07:41

## 2025-08-05 RX ADMIN — ROCURONIUM BROMIDE 10 MG: 50 INJECTION, SOLUTION INTRAVENOUS at 07:46

## 2025-08-05 RX ADMIN — SUCRALFATE 1 G: 1 SUSPENSION ORAL at 01:38

## 2025-08-05 RX ADMIN — SODIUM CHLORIDE, SODIUM LACTATE, POTASSIUM CHLORIDE, AND CALCIUM CHLORIDE: .6; .31; .03; .02 INJECTION, SOLUTION INTRAVENOUS at 02:45

## 2025-08-05 RX ADMIN — PROPOFOL 140 MG: 10 INJECTION, EMULSION INTRAVENOUS at 07:37

## 2025-08-05 RX ADMIN — FENTANYL CITRATE 50 MCG: 50 INJECTION INTRAMUSCULAR; INTRAVENOUS at 07:30

## 2025-08-05 RX ADMIN — HYDROMORPHONE HYDROCHLORIDE 0.2 MG: 0.2 INJECTION, SOLUTION INTRAMUSCULAR; INTRAVENOUS; SUBCUTANEOUS at 04:00

## 2025-08-05 RX ADMIN — PIPERACILLIN AND TAZOBACTAM 3.38 G: 3; .375 INJECTION, POWDER, FOR SOLUTION INTRAVENOUS at 07:43

## 2025-08-05 RX ADMIN — PHENYLEPHRINE HYDROCHLORIDE 100 MCG: 10 INJECTION INTRAVENOUS at 07:51

## 2025-08-05 RX ADMIN — PROCHLORPERAZINE EDISYLATE 10 MG: 5 INJECTION INTRAMUSCULAR; INTRAVENOUS at 03:47

## 2025-08-05 RX ADMIN — HYDROMORPHONE HYDROCHLORIDE 0.4 MG: 0.2 INJECTION, SOLUTION INTRAMUSCULAR; INTRAVENOUS; SUBCUTANEOUS at 06:22

## 2025-08-05 RX ADMIN — DEXMEDETOMIDINE HYDROCHLORIDE 8 MCG: 100 INJECTION, SOLUTION INTRAVENOUS at 08:37

## 2025-08-05 RX ADMIN — ROCURONIUM BROMIDE 40 MG: 50 INJECTION, SOLUTION INTRAVENOUS at 07:38

## 2025-08-05 RX ADMIN — PHENYLEPHRINE HYDROCHLORIDE 100 MCG: 10 INJECTION INTRAVENOUS at 07:53

## 2025-08-05 RX ADMIN — PHENYLEPHRINE HYDROCHLORIDE 100 MCG: 10 INJECTION INTRAVENOUS at 07:52

## 2025-08-05 RX ADMIN — Medication 2 G: at 07:34

## 2025-08-05 ASSESSMENT — ACTIVITIES OF DAILY LIVING (ADL)
ADLS_ACUITY_SCORE: 41
ADLS_ACUITY_SCORE: 20
ADLS_ACUITY_SCORE: 41
ADLS_ACUITY_SCORE: 41
ADLS_ACUITY_SCORE: 20

## 2025-08-06 ENCOUNTER — PATIENT OUTREACH (OUTPATIENT)
Dept: CARE COORDINATION | Facility: CLINIC | Age: 45
End: 2025-08-06
Payer: COMMERCIAL

## 2025-08-06 LAB
PATH REPORT.COMMENTS IMP SPEC: NORMAL
PATH REPORT.COMMENTS IMP SPEC: NORMAL
PATH REPORT.FINAL DX SPEC: NORMAL
PATH REPORT.GROSS SPEC: NORMAL
PATH REPORT.MICROSCOPIC SPEC OTHER STN: NORMAL
PATH REPORT.RELEVANT HX SPEC: NORMAL
PHOTO IMAGE: NORMAL

## 2025-08-06 PROCEDURE — 88304 TISSUE EXAM BY PATHOLOGIST: CPT | Mod: 26 | Performed by: STUDENT IN AN ORGANIZED HEALTH CARE EDUCATION/TRAINING PROGRAM

## 2025-08-07 LAB
BACTERIA SPEC CULT: NORMAL
BACTERIA SPEC CULT: NORMAL

## 2025-08-10 LAB
BACTERIA SPEC CULT: NO GROWTH
BACTERIA SPEC CULT: NO GROWTH

## 2025-08-16 ENCOUNTER — HEALTH MAINTENANCE LETTER (OUTPATIENT)
Age: 45
End: 2025-08-16

## 2025-08-27 ENCOUNTER — TELEPHONE (OUTPATIENT)
Dept: SURGERY | Facility: CLINIC | Age: 45
End: 2025-08-27
Payer: COMMERCIAL

## (undated) DEVICE — Device

## (undated) DEVICE — ESU HOLDER LAP INST DISP PURPLE LONG 330MM H-PRO-330

## (undated) DEVICE — GLOVE PROTEXIS BLUE W/NEU-THERA 6.5  2D73EB65

## (undated) DEVICE — SU MONOCRYL 4-0 PS-2 18" UND Y496G

## (undated) DEVICE — ESU GROUND PAD UNIVERSAL W/O CORD

## (undated) DEVICE — STPL RELOAD REG TISSUE ECHELON 45 X 3.6MM BLUE GST45B

## (undated) DEVICE — ENDO TROCAR SLEEVE KII Z-THREADED 05X100MM CTS02

## (undated) DEVICE — STPL POWERED ECHELON 45MM PSEE45A

## (undated) DEVICE — LINEN TOWEL PACK X5 5464

## (undated) DEVICE — ENDO TROCAR FIRST ENTRY KII FIOS Z-THRD 05X100MM CTF03

## (undated) DEVICE — CLIP APPLIER ENDO 5MM M/L LIGAMAX EL5ML

## (undated) DEVICE — PREP CHLORAPREP 26ML TINTED HI-LITE ORANGE 930815

## (undated) DEVICE — PACK LAP CHOLE SLC15LCFSD

## (undated) DEVICE — ESU LIGASURE MARYLAND LAPAROSCOPIC SLR/DVDR 5MMX37CM LF1937

## (undated) DEVICE — SU VICRYL+ 0 27 UR6 VLT VCP603H

## (undated) DEVICE — ENDO TROCAR FIRST ENTRY KII FIOS Z-THRD 12X100MM CTF73

## (undated) DEVICE — DEVICE SUTURE PASSER 14GA WECK EFX EFXSP2

## (undated) DEVICE — DRSG STERI STRIP 1/2X4" R1547

## (undated) DEVICE — KIT TURNOVER FAIRVIEW SOUTHDALE FULL SP3889

## (undated) DEVICE — SOL NACL 0.9% INJ 1000ML BAG 2B1324X

## (undated) DEVICE — ENDO POUCH RETRIEVAL SYSTEM OD11 MM 265 ML CD005

## (undated) DEVICE — NEEDLE HYPO MONOJECT STANDARD 22GA 1 1/2IN BLUE 1188822112

## (undated) RX ORDER — PROPOFOL 10 MG/ML
INJECTION, EMULSION INTRAVENOUS
Status: DISPENSED
Start: 2025-08-05

## (undated) RX ORDER — KETOROLAC TROMETHAMINE 30 MG/ML
INJECTION, SOLUTION INTRAMUSCULAR; INTRAVENOUS
Status: DISPENSED
Start: 2025-08-05

## (undated) RX ORDER — PIPERACILLIN SODIUM, TAZOBACTAM SODIUM 3; .375 G/15ML; G/15ML
INJECTION, POWDER, LYOPHILIZED, FOR SOLUTION INTRAVENOUS
Status: DISPENSED
Start: 2025-08-05

## (undated) RX ORDER — FENTANYL CITRATE 50 UG/ML
INJECTION, SOLUTION INTRAMUSCULAR; INTRAVENOUS
Status: DISPENSED
Start: 2025-08-05

## (undated) RX ORDER — ACETAMINOPHEN 500 MG
TABLET ORAL
Status: DISPENSED
Start: 2025-08-05

## (undated) RX ORDER — DEXAMETHASONE SODIUM PHOSPHATE 4 MG/ML
INJECTION, SOLUTION INTRA-ARTICULAR; INTRALESIONAL; INTRAMUSCULAR; INTRAVENOUS; SOFT TISSUE
Status: DISPENSED
Start: 2025-08-05

## (undated) RX ORDER — OXYCODONE HYDROCHLORIDE 5 MG/1
TABLET ORAL
Status: DISPENSED
Start: 2025-08-05

## (undated) RX ORDER — BUPIVACAINE HYDROCHLORIDE AND EPINEPHRINE 5; 5 MG/ML; UG/ML
INJECTION, SOLUTION EPIDURAL; INTRACAUDAL; PERINEURAL
Status: DISPENSED
Start: 2025-08-05

## (undated) RX ORDER — CEFAZOLIN SODIUM/WATER 2 G/20 ML
SYRINGE (ML) INTRAVENOUS
Status: DISPENSED
Start: 2025-08-05

## (undated) RX ORDER — GLYCOPYRROLATE 0.2 MG/ML
INJECTION, SOLUTION INTRAMUSCULAR; INTRAVENOUS
Status: DISPENSED
Start: 2025-08-05

## (undated) RX ORDER — ONDANSETRON 2 MG/ML
INJECTION INTRAMUSCULAR; INTRAVENOUS
Status: DISPENSED
Start: 2025-08-05